# Patient Record
Sex: FEMALE | Race: WHITE | NOT HISPANIC OR LATINO | Employment: OTHER | URBAN - METROPOLITAN AREA
[De-identification: names, ages, dates, MRNs, and addresses within clinical notes are randomized per-mention and may not be internally consistent; named-entity substitution may affect disease eponyms.]

---

## 2018-02-12 ENCOUNTER — OFFICE VISIT (OUTPATIENT)
Dept: PODIATRY | Facility: CLINIC | Age: 83
End: 2018-02-12
Payer: MEDICARE

## 2018-02-12 VITALS
SYSTOLIC BLOOD PRESSURE: 138 MMHG | RESPIRATION RATE: 17 BRPM | WEIGHT: 200 LBS | HEART RATE: 81 BPM | HEIGHT: 65 IN | BODY MASS INDEX: 33.32 KG/M2 | DIASTOLIC BLOOD PRESSURE: 89 MMHG

## 2018-02-12 DIAGNOSIS — I70.209 PERIPHERAL ARTERIOSCLEROSIS (HCC): ICD-10-CM

## 2018-02-12 DIAGNOSIS — E11.42 DIABETIC POLYNEUROPATHY ASSOCIATED WITH TYPE 2 DIABETES MELLITUS (HCC): ICD-10-CM

## 2018-02-12 DIAGNOSIS — M21.969 ACQUIRED DEFORMITY OF FOOT, UNSPECIFIED LATERALITY: Primary | ICD-10-CM

## 2018-02-12 DIAGNOSIS — M20.42 HAMMER TOES OF BOTH FEET: ICD-10-CM

## 2018-02-12 DIAGNOSIS — M79.89 SWELLING OF LOWER LIMB: ICD-10-CM

## 2018-02-12 DIAGNOSIS — M20.41 HAMMER TOES OF BOTH FEET: ICD-10-CM

## 2018-02-12 PROCEDURE — 99203 OFFICE O/P NEW LOW 30 MIN: CPT | Performed by: PODIATRIST

## 2018-02-12 RX ORDER — SOTALOL HYDROCHLORIDE 80 MG/1
TABLET ORAL
COMMUNITY
Start: 2018-01-22

## 2018-02-12 RX ORDER — GABAPENTIN 100 MG/1
100 CAPSULE ORAL 3 TIMES DAILY
Qty: 90 CAPSULE | Refills: 0 | Status: SHIPPED | OUTPATIENT
Start: 2018-02-12 | End: 2018-04-09 | Stop reason: SDUPTHER

## 2018-02-12 RX ORDER — SPIRONOLACTONE 25 MG/1
TABLET ORAL
COMMUNITY
Start: 2017-12-06

## 2018-02-12 RX ORDER — ATORVASTATIN CALCIUM 80 MG/1
TABLET, FILM COATED ORAL
COMMUNITY
Start: 2017-12-28

## 2018-02-12 RX ORDER — LINAGLIPTIN 5 MG/1
TABLET, FILM COATED ORAL
COMMUNITY
Start: 2018-02-10

## 2018-02-12 NOTE — PROGRESS NOTES
Assessment/Plan:  Patient has edema  Patient has diabetic neuropathy  Ingrown  Toenails  Plan  Diabetic foot exam performed  Venous Doppler testing be ordered to rule out deep venous insufficiency  We will add a empiric course of gabapentin  No problem-specific Assessment & Plan notes found for this encounter  There are no diagnoses linked to this encounter  Subjective:      Patient ID: Niall Haddad is a 80 y o  female  Patient presents for evaluation  Patient is a diabetic  She complains of pain in her feet and legs at night  She is also concerned with ingrown toenails  She is concerned with swelling of her legs and feet that occurred at the end of the day  The following portions of the patient's history were reviewed and updated as appropriate: allergies, current medications, past family history, past medical history, past social history, past surgical history and problem list   Review of Systems      Objective: Foot Exam    Right Foot/Ankle     Inspection and Palpation  Ecchymosis: none  Tenderness: bony tenderness   Swelling: metatarsals   Arch: pes planus  Hammertoes: second toe and third toe  Skin Exam: skin intact; Neurovascular  Dorsalis pedis: 1+  Posterior tibial: 1+  Saphenous nerve sensation: diminished  Tibial nerve sensation: diminished  Superficial peroneal nerve sensation: diminished  Deep peroneal nerve sensation: diminished  Sural nerve sensation: diminished  Achilles reflex: 0    Tests  Too many toes: positive   Heel raise: pain       Left Foot/Ankle      Inspection and Palpation  Ecchymosis: none  Tenderness: bony tenderness   Swelling: metatarsals   Arch: pes planus  Hammertoes: second toe and third toe  Skin Exam: skin intact;      Neurovascular  Dorsalis pedis: 1+  Posterior tibial: 1+  Saphenous nerve sensation: diminished  Tibial nerve sensation: diminished  Superficial peroneal nerve sensation: diminished  Deep peroneal nerve sensation: diminished  Sural nerve sensation: diminished  Achilles reflex: 0    Tests  Too many toes: positive   Heel raise: pain       Physical Exam   Cardiovascular: Pulses are weak pulses  Pulses:       Dorsalis pedis pulses are 1+ on the right side, and 1+ on the left side  Posterior tibial pulses are 1+ on the right side, and 1+ on the left side  Musculoskeletal:        Right foot: There is bony tenderness  Left foot: There is bony tenderness  Feet:   Right Foot:   Skin Integrity: Positive for warmth  Left Foot:   Skin Integrity: Positive for warmth  Neurological:   Reflex Scores:       Achilles reflexes are 0 on the right side and 0 on the left side  Patient's shoes and socks removed  Right Foot/Ankle   Right Foot Inspection  Skin Exam: skin intact and warmth                          Toe Exam: swelling  Sensory   Vibration: diminished  Proprioception: diminished   Monofilament testing: diminished  Vascular  Capillary refills: < 3 seconds  The right DP pulse is 1+  The right PT pulse is 1+  Right Toe  - Comprehensive Exam  Ecchymosis: none  Arch: pes planus  Hammertoes: second toe and third toe  Swelling: metatarsals   Tenderness: bony tenderness         Left Foot/Ankle  Left Foot Inspection  Skin Exam: skin intact and warmth                         Toe Exam: swelling                   Sensory   Vibration: diminished  Proprioception: diminished  Monofilament: diminished  Vascular  Capillary refills: < 3 seconds  The left DP pulse is 1+  The left PT pulse is 1+  Left Toe  - Comprehensive Exam  Ecchymosis: none  Arch: pes planus  Hammertoes: second toe and third toe  Swelling: metatarsals   Tenderness: bony tenderness       Assign Risk Category:  Deformity present;  Loss of protective sensation; Weak pulses       Risk: 2

## 2018-03-12 ENCOUNTER — OFFICE VISIT (OUTPATIENT)
Dept: PODIATRY | Facility: CLINIC | Age: 83
End: 2018-03-12
Payer: MEDICARE

## 2018-03-12 VITALS
RESPIRATION RATE: 17 BRPM | HEIGHT: 65 IN | WEIGHT: 200 LBS | HEART RATE: 78 BPM | SYSTOLIC BLOOD PRESSURE: 142 MMHG | DIASTOLIC BLOOD PRESSURE: 80 MMHG | BODY MASS INDEX: 33.32 KG/M2

## 2018-03-12 DIAGNOSIS — I70.209 PERIPHERAL ARTERIOSCLEROSIS (HCC): ICD-10-CM

## 2018-03-12 DIAGNOSIS — M21.969 ACQUIRED DEFORMITY OF FOOT, UNSPECIFIED LATERALITY: ICD-10-CM

## 2018-03-12 DIAGNOSIS — E11.42 DIABETIC POLYNEUROPATHY ASSOCIATED WITH TYPE 2 DIABETES MELLITUS (HCC): Primary | ICD-10-CM

## 2018-03-12 PROCEDURE — 99213 OFFICE O/P EST LOW 20 MIN: CPT | Performed by: PODIATRIST

## 2018-03-12 RX ORDER — DULOXETIN HYDROCHLORIDE 30 MG/1
30 CAPSULE, DELAYED RELEASE ORAL DAILY
Qty: 30 CAPSULE | Refills: 0 | Status: SHIPPED | OUTPATIENT
Start: 2018-03-12 | End: 2018-04-11

## 2018-03-12 NOTE — PROGRESS NOTES
Assessment/Plan:  Patient has diabetic neuropathy  Plan  We will change to Cymbalta    No problem-specific Assessment & Plan notes found for this encounter  There are no diagnoses linked to this encounter  Subjective:      Patient ID: Ana Dugan is a 80 y o  female  Patient has continued pain in her feet  She was unable to tolerate gabapentin  The following portions of the patient's history were reviewed and updated as appropriate: allergies, current medications, past family history, past medical history, past social history, past surgical history and problem list     Review of Systems      Objective:  Patient ID: Ana Dugan is a 80 y o  female      Patient presents for evaluation  Patient is a diabetic  She complains of pain in her feet and legs at night  She is also concerned with ingrown toenails    She is concerned with swelling of her legs and feet that occurred at the end of the day            The following portions of the patient's history were reviewed and updated as appropriate: allergies, current medications, past family history, past medical history, past social history, past surgical history and problem list   Review of Systems       Objective:      Foot Exam     Right Foot/Ankle      Inspection and Palpation  Ecchymosis: none  Tenderness: bony tenderness   Swelling: metatarsals   Arch: pes planus  Hammertoes: second toe and third toe  Skin Exam: skin intact;      Neurovascular  Dorsalis pedis: 1+  Posterior tibial: 1+  Saphenous nerve sensation: diminished  Tibial nerve sensation: diminished  Superficial peroneal nerve sensation: diminished  Deep peroneal nerve sensation: diminished  Sural nerve sensation: diminished  Achilles reflex: 0     Tests  Too many toes: positive   Heel raise: pain         Left Foot/Ankle       Inspection and Palpation  Ecchymosis: none  Tenderness: bony tenderness   Swelling: metatarsals   Arch: pes planus  Hammertoes: second toe and third toe  Skin Exam: skin intact;      Neurovascular  Dorsalis pedis: 1+  Posterior tibial: 1+  Saphenous nerve sensation: diminished  Tibial nerve sensation: diminished  Superficial peroneal nerve sensation: diminished  Deep peroneal nerve sensation: diminished  Sural nerve sensation: diminished  Achilles reflex: 0     Tests  Too many toes: positive   Heel raise: pain      Physical Exam   Cardiovascular: Pulses are weak pulses  Pulses:       Dorsalis pedis pulses are 1+ on the right side, and 1+ on the left side  Posterior tibial pulses are 1+ on the right side, and 1+ on the left side  Musculoskeletal:        Right foot: There is bony tenderness  Left foot: There is bony tenderness  Feet:   Right Foot:   Skin Integrity: Positive for warmth  Left Foot:   Skin Integrity: Positive for warmth  Neurological:   Reflex Scores:       Achilles reflexes are 0 on the right side and 0 on the left side  Patient's shoes and socks removed  Right Foot/Ankle   Right Foot Inspection  Skin Exam: skin intact and warmth                           Toe Exam: swelling  Sensory   Vibration: diminished  Proprioception: diminished   Monofilament testing: diminished  Vascular  Capillary refills: < 3 seconds  The right DP pulse is 1+  The right PT pulse is 1+  Right Toe  - Comprehensive Exam  Ecchymosis: none  Arch: pes planus  Hammertoes: second toe and third toe  Swelling: metatarsals   Tenderness: bony tenderness            Left Foot/Ankle  Left Foot Inspection  Skin Exam: skin intact and warmth                          Toe Exam: swelling                   Sensory   Vibration: diminished  Proprioception: diminished  Monofilament: diminished  Vascular  Capillary refills: < 3 seconds  The left DP pulse is 1+  The left PT pulse is 1+     Left Toe  - Comprehensive Exam  Ecchymosis: none  Arch: pes planus  Hammertoes: second toe and third toe  Swelling: metatarsals   Tenderness: bony tenderness         Assign Risk Category:  Deformity present;  Loss of protective sensation; Weak pulses       Risk: 2         Foot ExamPhysical Exam

## 2018-04-09 ENCOUNTER — OFFICE VISIT (OUTPATIENT)
Dept: PODIATRY | Facility: CLINIC | Age: 83
End: 2018-04-09
Payer: MEDICARE

## 2018-04-09 VITALS
HEART RATE: 67 BPM | DIASTOLIC BLOOD PRESSURE: 84 MMHG | RESPIRATION RATE: 17 BRPM | SYSTOLIC BLOOD PRESSURE: 144 MMHG | BODY MASS INDEX: 33.32 KG/M2 | HEIGHT: 65 IN | WEIGHT: 200 LBS

## 2018-04-09 DIAGNOSIS — M77.41 METATARSALGIA OF BOTH FEET: ICD-10-CM

## 2018-04-09 DIAGNOSIS — M21.969 ACQUIRED DEFORMITY OF FOOT, UNSPECIFIED LATERALITY: Primary | ICD-10-CM

## 2018-04-09 DIAGNOSIS — M77.42 METATARSALGIA OF BOTH FEET: ICD-10-CM

## 2018-04-09 DIAGNOSIS — I70.209 PERIPHERAL ARTERIOSCLEROSIS (HCC): ICD-10-CM

## 2018-04-09 DIAGNOSIS — E11.42 DIABETIC POLYNEUROPATHY ASSOCIATED WITH TYPE 2 DIABETES MELLITUS (HCC): ICD-10-CM

## 2018-04-09 PROCEDURE — 99212 OFFICE O/P EST SF 10 MIN: CPT | Performed by: PODIATRIST

## 2018-04-09 RX ORDER — GABAPENTIN 100 MG/1
100 CAPSULE ORAL 3 TIMES DAILY
Qty: 90 CAPSULE | Refills: 0 | Status: SHIPPED | OUTPATIENT
Start: 2018-04-09 | End: 2018-05-09

## 2018-04-09 RX ORDER — DULAGLUTIDE 0.75 MG/.5ML
INJECTION, SOLUTION SUBCUTANEOUS
COMMUNITY
Start: 2018-04-05

## 2018-04-09 RX ORDER — FLUCONAZOLE 150 MG/1
TABLET ORAL
COMMUNITY
Start: 2018-02-19

## 2018-04-09 RX ORDER — NYSTATIN 100000 U/G
OINTMENT TOPICAL
COMMUNITY
Start: 2018-03-03

## 2018-04-09 NOTE — PROGRESS NOTES
Assessment/Plan:  Improving diabetic neuropathy  Plan  All mycotic nails debrided  Increasing gabapentin will be ordered  No problem-specific Assessment & Plan notes found for this encounter       Foot Exam     Right Foot/Ankle      Inspection and Palpation  Ecchymosis: none  Tenderness: bony tenderness   Swelling: metatarsals   Arch: pes planus  Hammertoes: second toe and third toe  Skin Exam: skin intact;      Neurovascular  Dorsalis pedis: 1+  Posterior tibial: 1+  Saphenous nerve sensation: diminished  Tibial nerve sensation: diminished  Superficial peroneal nerve sensation: diminished  Deep peroneal nerve sensation: diminished  Sural nerve sensation: diminished  Achilles reflex: 0     Tests  Too many toes: positive   Heel raise: pain         Left Foot/Ankle       Inspection and Palpation  Ecchymosis: none  Tenderness: bony tenderness   Swelling: metatarsals   Arch: pes planus  Hammertoes: second toe and third toe  Skin Exam: skin intact;      Neurovascular  Dorsalis pedis: 1+  Posterior tibial: 1+  Saphenous nerve sensation: diminished  Tibial nerve sensation: diminished  Superficial peroneal nerve sensation: diminished  Deep peroneal nerve sensation: diminished  Sural nerve sensation: diminished  Achilles reflex: 0     Tests  Too many toes: positive   Heel raise: pain      Physical Exam   Cardiovascular: Pulses are weak pulses  Pulses:       Dorsalis pedis pulses are 1+ on the right side, and 1+ on the left side         Posterior tibial pulses are 1+ on the right side, and 1+ on the left side  Musculoskeletal:        Right foot: There is bony tenderness         Left foot: There is bony tenderness  Feet:   Right Foot:   Skin Integrity: Positive for warmth  Left Foot:   Skin Integrity: Positive for warmth  Neurological:   Reflex Scores:       Achilles reflexes are 0 on the right side and 0 on the left side  Patient's shoes and socks removed  Right Foot/Ankle   Right Foot Inspection  Skin Exam: skin intact and warmth               Toe Exam: swelling  Sensory   Vibration: diminished  Proprioception: diminished   Monofilament testing: diminished  Vascular  Capillary refills: < 3 seconds  The right DP pulse is 1+  The right PT pulse is 1+  Right Toe  - Comprehensive Exam  Ecchymosis: none  Arch: pes planus  Hammertoes: second toe and third toe  Swelling: metatarsals   Tenderness: bony tenderness            Left Foot/Ankle  Left Foot Inspection  Skin Exam: skin intact and warmth              Toe Exam: swelling                   Sensory   Vibration: diminished  Proprioception: diminished  Monofilament: diminished  Vascular  Capillary refills: < 3 seconds  The left DP pulse is 1+  The left PT pulse is 1+  Left Toe  - Comprehensive Exam  Ecchymosis: none  Arch: pes planus  Hammertoes: second toe and third toe  Swelling: metatarsals   Tenderness: bony tenderness         Assign Risk Category:  Deformity present; Loss of protective sensation; Weak pulses       Risk: 2        Diagnoses and all orders for this visit:    Acquired deformity of foot, unspecified laterality    Diabetic polyneuropathy associated with type 2 diabetes mellitus (HCC)  -     gabapentin (NEURONTIN) 100 mg capsule; Take 1 capsule (100 mg total) by mouth 3 (three) times a day for 30 days    Peripheral arteriosclerosis (HCC)    Metatarsalgia of both feet    Other orders  -     [facility];   -     nystatin (MYCOSTATIN) ointment;   -     fluconazole (DIFLUCAN) 150 mg tablet;           Subjective:      Patient ID: Juana Arias is a 80 y o  female  Patient is diabetic  She has pain in her feet with ambulation  She has much less pain in her feet at night    She is taking gabapentin 100 mg daily HS        The following portions of the patient's history were reviewed and updated as appropriate: allergies, current medications, past family history, past medical history, past social history, past surgical history and problem list     Review of Systems      Objective: Foot Exam    Right Foot/Ankle     Inspection and Palpation  Skin Exam: callus; Neurovascular  Dorsalis pedis: 0  Posterior tibial: 1+      Left Foot/Ankle      Inspection and Palpation  Skin Exam: callus; Neurovascular  Dorsalis pedis: 0  Posterior tibial: 1+        Physical Exam   Cardiovascular: Pulses are weak pulses  Pulses:       Dorsalis pedis pulses are 0 on the right side, and 0 on the left side  Posterior tibial pulses are 1+ on the right side, and 1+ on the left side  Feet:   Right Foot:   Skin Integrity: Positive for callus  Left Foot:   Skin Integrity: Positive for callus  Patient's shoes and socks removed  Right Foot/Ankle   Right Foot Inspection  Skin Exam: callus and callus                          Toe Exam: swelling and erythema  Sensory   Vibration: absent  Proprioception: absent   Monofilament testing: absent  Vascular  Capillary refills: < 3 seconds  The right DP pulse is 0  The right PT pulse is 1+  Left Foot/Ankle  Left Foot Inspection  Skin Exam: callus                         Toe Exam: swelling and erythema                   Sensory   Vibration: absent  Proprioception: absent  Monofilament: absent  Vascular  Capillary refills: elevated  The left DP pulse is 0  The left PT pulse is 1+  Assign Risk Category:  Deformity present;  Loss of protective sensation; Weak pulses       Risk: 2

## 2018-05-14 ENCOUNTER — OFFICE VISIT (OUTPATIENT)
Dept: PODIATRY | Facility: CLINIC | Age: 83
End: 2018-05-14
Payer: MEDICARE

## 2018-05-14 VITALS
RESPIRATION RATE: 17 BRPM | HEIGHT: 65 IN | HEART RATE: 68 BPM | BODY MASS INDEX: 33.32 KG/M2 | SYSTOLIC BLOOD PRESSURE: 133 MMHG | DIASTOLIC BLOOD PRESSURE: 74 MMHG | WEIGHT: 200 LBS

## 2018-05-14 DIAGNOSIS — M21.969 ACQUIRED DEFORMITY OF FOOT, UNSPECIFIED LATERALITY: Primary | ICD-10-CM

## 2018-05-14 DIAGNOSIS — M79.89 SWELLING OF LOWER LIMB: ICD-10-CM

## 2018-05-14 DIAGNOSIS — I70.209 PERIPHERAL ARTERIOSCLEROSIS (HCC): ICD-10-CM

## 2018-05-14 DIAGNOSIS — M77.42 METATARSALGIA OF BOTH FEET: ICD-10-CM

## 2018-05-14 DIAGNOSIS — E11.42 DIABETIC POLYNEUROPATHY ASSOCIATED WITH TYPE 2 DIABETES MELLITUS (HCC): ICD-10-CM

## 2018-05-14 DIAGNOSIS — M77.41 METATARSALGIA OF BOTH FEET: ICD-10-CM

## 2018-05-14 PROCEDURE — 99212 OFFICE O/P EST SF 10 MIN: CPT | Performed by: PODIATRIST

## 2018-05-14 RX ORDER — PEN NEEDLE, DIABETIC 32GX 5/32"
NEEDLE, DISPOSABLE MISCELLANEOUS
COMMUNITY
Start: 2018-04-10

## 2018-05-14 NOTE — PROGRESS NOTES
Procedures   Foot Exam     Assessment/Plan:  Improving diabetic neuropathy      Plan  All mycotic nails debrided  Increasing gabapentin will be ordered  No problem-specific Assessment & Plan notes found for this encounter       Foot Exam     Right Foot/Ankle      Inspection and Palpation  Ecchymosis: none  Tenderness: bony tenderness   Swelling: metatarsals   Arch: pes planus  Hammertoes: second toe and third toe  Skin Exam: skin intact;      Neurovascular  Dorsalis pedis: 1+  Posterior tibial: 1+  Saphenous nerve sensation: diminished  Tibial nerve sensation: diminished  Superficial peroneal nerve sensation: diminished  Deep peroneal nerve sensation: diminished  Sural nerve sensation: diminished  Achilles reflex: 0     Tests  Too many toes: positive   Heel raise: pain         Left Foot/Ankle       Inspection and Palpation  Ecchymosis: none  Tenderness: bony tenderness   Swelling: metatarsals   Arch: pes planus  Hammertoes: second toe and third toe  Skin Exam: skin intact;      Neurovascular  Dorsalis pedis: 1+  Posterior tibial: 1+  Saphenous nerve sensation: diminished  Tibial nerve sensation: diminished  Superficial peroneal nerve sensation: diminished  Deep peroneal nerve sensation: diminished  Sural nerve sensation: diminished  Achilles reflex: 0     Tests  Too many toes: positive   Heel raise: pain      Physical Exam   Cardiovascular: Pulses are weak pulses  Pulses:       Dorsalis pedis pulses are 1+ on the right side, and 1+ on the left side         Posterior tibial pulses are 1+ on the right side, and 1+ on the left side  Musculoskeletal:        Right foot: There is bony tenderness         Left foot: There is bony tenderness  Feet:   Right Foot:   Skin Integrity: Positive for warmth  Left Foot:   Skin Integrity: Positive for warmth  Neurological:   Reflex Scores:       Achilles reflexes are 0 on the right side and 0 on the left side  Patient's shoes and socks removed  Right Foot/Ankle   Right Foot Inspection  Skin Exam: skin intact and warmth               Toe Exam: swelling  Sensory   Vibration: diminished  Proprioception: diminished   Monofilament testing: diminished  Vascular  Capillary refills: < 3 seconds  The right DP pulse is 1+  The right PT pulse is 1+  Right Toe  - Comprehensive Exam  Ecchymosis: none  Arch: pes planus  Hammertoes: second toe and third toe  Swelling: metatarsals   Tenderness: bony tenderness            Left Foot/Ankle  Left Foot Inspection  Skin Exam: skin intact and warmth              Toe Exam: swelling                   Sensory   Vibration: diminished  Proprioception: diminished  Monofilament: diminished  Vascular  Capillary refills: < 3 seconds  The left DP pulse is 1+  The left PT pulse is 1+  Left Toe  - Comprehensive Exam  Ecchymosis: none  Arch: pes planus  Hammertoes: second toe and third toe  Swelling: metatarsals   Tenderness: bony tenderness         Assign Risk Category:  Deformity present; Loss of protective sensation; Weak pulses       Risk: 2         Diagnoses and all orders for this visit:     Acquired deformity of foot, unspecified laterality     Diabetic polyneuropathy associated with type 2 diabetes mellitus (HCC)  -     gabapentin (NEURONTIN) 100 mg capsule; Take 1 capsule (100 mg total) by mouth 3 (three) times a day for 30 days     Peripheral arteriosclerosis (HCC)     Metatarsalgia of both feet     Other orders  -     TRULICITY 6 21 UE/6 2JO SOPN;   -     nystatin (MYCOSTATIN) ointment;   -     fluconazole (DIFLUCAN) 150 mg tablet;             Subjective:       Patient ID: Sandra Garza is a 80 y o  female      Patient is diabetic  She has pain in her feet with ambulation  She has much less pain in her feet at night    She is taking gabapentin 100 mg daily HS        The following portions of the patient's history were reviewed and updated as appropriate: allergies, current medications, past family history, past medical history, past social history, past surgical history and problem list      Review of Systems       Objective:      Foot Exam     Right Foot/Ankle      Inspection and Palpation  Skin Exam: callus;      Neurovascular  Dorsalis pedis: 0  Posterior tibial: 1+        Left Foot/Ankle       Inspection and Palpation  Skin Exam: callus;      Neurovascular  Dorsalis pedis: 0  Posterior tibial: 1+        Physical Exam   Cardiovascular: Pulses are weak pulses  Pulses:       Dorsalis pedis pulses are 0 on the right side, and 0 on the left side  Posterior tibial pulses are 1+ on the right side, and 1+ on the left side  Feet:   Right Foot:   Skin Integrity: Positive for callus  Left Foot:   Skin Integrity: Positive for callus  Patient's shoes and socks removed  Right Foot/Ankle   Right Foot Inspection  Skin Exam: callus and callus                           Toe Exam: swelling and erythema  Sensory   Vibration: absent  Proprioception: absent   Monofilament testing: absent  Vascular  Capillary refills: < 3 seconds  The right DP pulse is 0  The right PT pulse is 1+       Left Foot/Ankle  Left Foot Inspection  Skin Exam: callus                          Toe Exam: swelling and erythema                   Sensory   Vibration: absent  Proprioception: absent  Monofilament: absent  Vascular  Capillary refills: elevated  The left DP pulse is 0  The left PT pulse is 1+  Assign Risk Category:  Deformity present;  Loss of protective sensation; Weak pulses       Risk: 2

## 2018-08-14 ENCOUNTER — OFFICE VISIT (OUTPATIENT)
Dept: PODIATRY | Facility: CLINIC | Age: 83
End: 2018-08-14
Payer: MEDICARE

## 2018-08-14 VITALS
DIASTOLIC BLOOD PRESSURE: 75 MMHG | RESPIRATION RATE: 17 BRPM | HEIGHT: 65 IN | WEIGHT: 200 LBS | BODY MASS INDEX: 33.32 KG/M2 | SYSTOLIC BLOOD PRESSURE: 138 MMHG | HEART RATE: 68 BPM

## 2018-08-14 DIAGNOSIS — R20.2 PARESTHESIA: ICD-10-CM

## 2018-08-14 DIAGNOSIS — L60.0 INGROWN TOENAIL: ICD-10-CM

## 2018-08-14 DIAGNOSIS — L02.611 ABSCESS OR CELLULITIS, TOE, RIGHT: Primary | ICD-10-CM

## 2018-08-14 DIAGNOSIS — L03.031 ABSCESS OR CELLULITIS, TOE, RIGHT: Primary | ICD-10-CM

## 2018-08-14 DIAGNOSIS — E11.42 DIABETIC POLYNEUROPATHY ASSOCIATED WITH TYPE 2 DIABETES MELLITUS (HCC): ICD-10-CM

## 2018-08-14 PROCEDURE — 99213 OFFICE O/P EST LOW 20 MIN: CPT | Performed by: PODIATRIST

## 2018-08-14 RX ORDER — CEFADROXIL 500 MG/1
500 CAPSULE ORAL EVERY 12 HOURS SCHEDULED
Qty: 14 CAPSULE | Refills: 1 | Status: SHIPPED | OUTPATIENT
Start: 2018-08-14 | End: 2018-08-21

## 2018-08-14 NOTE — PROGRESS NOTES
Assessment/Plan:    Partial avulsion ingrown nails  Right 3rd and 4th digits silver nitrate and dressing applied  Warm water and Epsom salt soaks  Neosporin dressing daily  Patient to discontinue medication if she has any signs of allergy although she says she has taken Keflex before and think she has taken other cephalosporins  Discussed possible need for increased dose of gabapentin because she is still having significant nerve pain especially at night although we will not do it in till she gets her blood sugar under better control since she recently started taking insulin  Follow-up 2 weeks     Diagnoses and all orders for this visit:    Abscess or cellulitis, toe, right  -     cefadroxil (DURICEF) 500 mg capsule; Take 1 capsule (500 mg total) by mouth every 12 (twelve) hours for 7 days    Ingrown toenail    Paresthesia    Diabetic polyneuropathy associated with type 2 diabetes mellitus ([facility] )          Subjective:      Patient ID: Maria R Pritchett is a 80 y o  female  Patient presents for emergency visit for infected ingrown nail right 3rd digit  Patient has had redness and swelling for the past few days  Patient denies any fever chills  Patient also has mild ingrown of the right 4th digit  Patient's diabetes has been completely out of control blood sugar has been fluctuating for the past few months A1c was 13  Patient just started insulin  Patient has been on gabapentin wonder mg p o  3 times a day says it has been helping but she is often tired although this may be secondary to poorly-controlled blood sugar  No past medical history on file  No past surgical history on file      Allergies   Allergen Reactions    Latex     Other      Adhesive tapo    Penicillins          Current Outpatient Prescriptions:     atorvastatin (LIPITOR) 80 mg tablet, , Disp: , Rfl:     BD PEN NEEDLE ALAINA U/F 32G X 4 MM MISC, , Disp: , Rfl:     cefadroxil (DURICEF) 500 mg capsule, Take 1 capsule (500 mg total) by mouth every 12 (twelve) hours for 7 days, Disp: 14 capsule, Rfl: 1    DULoxetine (CYMBALTA) 30 mg delayed release capsule, Take 1 capsule (30 mg total) by mouth daily for 30 days, Disp: 30 capsule, Rfl: 0    fluconazole (DIFLUCAN) 150 mg tablet, , Disp: , Rfl:     gabapentin (NEURONTIN) 100 mg capsule, Take 1 capsule (100 mg total) by mouth 3 (three) times a day for 30 days, Disp: 90 capsule, Rfl: 0    nystatin (MYCOSTATIN) ointment, , Disp: , Rfl:     sotalol (BETAPACE) 80 mg tablet, , Disp: , Rfl:     spironolactone (ALDACTONE) 25 mg tablet, , Disp: , Rfl:     TRADJENTA 5 MG TABS, , Disp: , Rfl:     TRULICITY 7 96 JQ/6 8FK SOPN, , Disp: , Rfl:     Patient Active Problem List   Diagnosis    Acquired deformity of foot    Diabetic polyneuropathy associated with type 2 diabetes mellitus (HCC)    Peripheral arteriosclerosis (HCC)    Hammer toes of both feet    Swelling of lower limb    Metatarsalgia of both feet       Review of Systems   Constitutional: Negative  HENT: Negative  Eyes: Negative  Respiratory: Negative  Cardiovascular: Negative  Gastrointestinal: Negative  Endocrine: Negative  Genitourinary: Negative  Musculoskeletal: Negative  Skin: Negative  Allergic/Immunologic: Negative  Neurological: Negative  Hematological: Negative  Psychiatric/Behavioral: Negative  Objective:  Patient's shoes and socks were removed, feet examined  /75   Pulse 68   Resp 17   Ht 5' 5" (1 651 m)   Wt 90 7 kg (200 lb)   BMI 33 28 kg/m²          Physical Exam   Cardiovascular: Pulses are weak pulses  Pulses:       Dorsalis pedis pulses are 1+ on the right side, and 1+ on the left side  Posterior tibial pulses are 1+ on the right side, and 1+ on the left side  Feet:   Right Foot:   Skin Integrity: Positive for callus  Left Foot:   Skin Integrity: Positive for callus  Patient's shoes and socks removed  Right Foot/Ankle   Right Foot Inspection  Skin Exam: callus and callus                            Sensory   Vibration: absent  Proprioception: diminished   Monofilament testing: diminished  Vascular  Capillary refills: elevated  The right DP pulse is 1+  The right PT pulse is 1+  Left Foot/Ankle  Left Foot Inspection  Skin Exam: callus                                         Sensory   Vibration: absent  Proprioception: diminished  Monofilament: diminished  Vascular  Capillary refills: elevated  The left DP pulse is 1+  The left PT pulse is 1+  Assign Risk Category:  Deformity present; Loss of protective sensation; Weak pulses       Risk: 2       Inspection and Palpation  Ecchymosis: none  Tenderness: bony tenderness   Swelling: metatarsals   Arch: pes planus  Hammertoes: second toe and third toe  Skin Exam: skin intact;      Neurovascular  Dorsalis pedis: 1+  Posterior tibial: 1+  Saphenous nerve sensation: diminished  Tibial nerve sensation: diminished  Superficial peroneal nerve sensation: diminished  Deep peroneal nerve sensation: diminished  Sural nerve sensation: diminished  Achilles reflex: 0     Tests  Too many toes: positive   Heel raise: pain         Left Foot/Ankle       Inspection and Palpation  Ecchymosis: none  Tenderness: bony tenderness   Swelling: metatarsals   Arch: pes planus  Hammertoes: second toe and third toe  Skin Exam: skin intact;      Neurovascular  Dorsalis pedis: 1+  Posterior tibial: 1+  Saphenous nerve sensation: diminished  Tibial nerve sensation: diminished  Superficial peroneal nerve sensation: diminished  Deep peroneal nerve sensation: diminished  Sural nerve sensation: diminished  Achilles reflex: 0     Tests  Too many toes: positive   Heel raise: pain      Physical Exam   Cardiovascular: Pulses are weak pulses  Pulses:       Dorsalis pedis pulses are 1+ on the right side, and 1+ on the left side         Posterior tibial pulses are 1+ on the right side, and 1+ on the left side     Musculoskeletal:      Right foot: There is bony tenderness         Left foot: There is bony tenderness  Feet:   Right Foot:   Skin Integrity: Positive for warmth  Left Foot:   Skin Integrity: Positive for warmth  Neurological:   Reflex Scores:       Achilles reflexes are 0 on the right side and 0 on the left side  Patient's shoes and socks removed  Right Foot/Ankle   Right Foot Inspection  Skin Exam: skin intact and warmth               Toe Exam: swelling  Sensory   Vibration: diminished  Proprioception: diminished   Monofilament testing: diminished  Vascular  Capillary refills: < 3 seconds  The right DP pulse is 1+  The right PT pulse is 1+  Right Toe  - Comprehensive Exam  Ecchymosis: none  Arch: pes planus  Hammertoes: second toe and third toe  Swelling: metatarsals   Tenderness: bony tenderness            Left Foot/Ankle  Left Foot Inspection  Skin Exam: skin intact and warmth              Toe Exam: swelling                   Sensory   Vibration: diminished  Proprioception: diminished  Monofilament: diminished  Vascular  Capillary refills: < 3 seconds  The left DP pulse is 1+  The left PT pulse is 1+  Left Toe  - Comprehensive Exam  Ecchymosis: none  Arch: pes planus  Hammertoes: second toe and third toe  Swelling: metatarsals   Tenderness: bony tenderness         Assign Risk Category:  Deformity present; Loss of protective sensation; Weak pulses       Risk: 2         Diagnoses and all orders for this visit:     Acquired deformity of foot, unspecified laterality     Diabetic polyneuropathy associated with type 2 diabetes mellitus (HCC)  -     gabapentin (NEURONTIN) 100 mg capsule; Take 1 capsule (100 mg total) by mouth 3 (three) times a day for 30 days     Peripheral arteriosclerosis (HCC)     Metatarsalgia of both feet     Other orders  -     TRULICITY 0 91 FT/4 2GB SOPN;   -     nystatin (MYCOSTATIN) ointment;    -     fluconazole (DIFLUCAN) 150 mg tablet;            Subjective:       Patient ID: Phyllis avalos a 83 y o  female      Patient is diabetic   She has pain in her feet with ambulation   She has much less pain in her feet at night   She is taking gabapentin 100 mg daily HS        The following portions of the patient's history were reviewed and updated as appropriate: allergies, current medications, past family history, past medical history, past social history, past surgical history and problem list      Review of Systems       Objective:      Foot Exam     Right Foot/Ankle      Inspection and Palpation  Skin Exam: callus;      Neurovascular  Dorsalis pedis: 0  Posterior tibial: 1+        Left Foot/Ankle       Inspection and Palpation  Skin Exam: callus;      Neurovascular  Dorsalis pedis: 0  Posterior tibial: 1+        Physical Exam   Cardiovascular: Pulses are weak pulses  Pulses:       Dorsalis pedis pulses are 0 on the right side, and 0 on the left side         Posterior tibial pulses are 1+ on the right side, and 1+ on the left side  Feet:   Right Foot:   Skin Integrity: Positive for callus  Left Foot:   Skin Integrity: Positive for callus  Patient's shoes and socks removed  Right Foot/Ankle   Right Foot Inspection  Skin Exam: callus and callus               Toe Exam: swelling and erythema  Sensory   Vibration: absent  Proprioception: absent   Monofilament testing: absent  Vascular  Capillary refills: < 3 seconds  The right DP pulse is 0  The right PT pulse is 1+       Left Foot/Ankle  Left Foot Inspection  Skin Exam: callus              Toe Exam: swelling and erythema                   Sensory   Vibration: absent  Proprioception: absent  Monofilament: absent  Vascular  Capillary refills: elevated  The left DP pulse is 0  The left PT pulse is 1+  Assign Risk Category:  Deformity present; Loss of protective sensation; Weak pulses        Infected ingrown nail right 3rd digit medial border    Positive erythema to MPJ negative fluctuance  Mild ingrown right 4th digit medial border          Risk: 2

## 2018-08-24 ENCOUNTER — OFFICE VISIT (OUTPATIENT)
Dept: PODIATRY | Facility: CLINIC | Age: 83
End: 2018-08-24
Payer: MEDICARE

## 2018-08-24 VITALS
WEIGHT: 200 LBS | HEIGHT: 65 IN | RESPIRATION RATE: 17 BRPM | BODY MASS INDEX: 33.32 KG/M2 | DIASTOLIC BLOOD PRESSURE: 84 MMHG | SYSTOLIC BLOOD PRESSURE: 143 MMHG

## 2018-08-24 DIAGNOSIS — L02.611 CELLULITIS AND ABSCESS OF TOE OF RIGHT FOOT: Primary | ICD-10-CM

## 2018-08-24 DIAGNOSIS — B35.3 TINEA PEDIS OF RIGHT FOOT: ICD-10-CM

## 2018-08-24 DIAGNOSIS — L03.031: ICD-10-CM

## 2018-08-24 DIAGNOSIS — L03.031 CELLULITIS AND ABSCESS OF TOE OF RIGHT FOOT: Primary | ICD-10-CM

## 2018-08-24 PROCEDURE — 10060 I&D ABSCESS SIMPLE/SINGLE: CPT | Performed by: PODIATRIST

## 2018-08-24 PROCEDURE — 99212 OFFICE O/P EST SF 10 MIN: CPT | Performed by: PODIATRIST

## 2018-08-24 RX ORDER — TERBINAFINE HYDROCHLORIDE 250 MG/1
TABLET ORAL
Qty: 15 TABLET | Refills: 0 | Status: SHIPPED | OUTPATIENT
Start: 2018-08-24 | End: 2018-09-24

## 2018-08-24 RX ORDER — CIPROFLOXACIN 500 MG/1
500 TABLET, FILM COATED ORAL EVERY 12 HOURS SCHEDULED
Qty: 14 TABLET | Refills: 0 | Status: SHIPPED | OUTPATIENT
Start: 2018-08-24 | End: 2018-08-31

## 2018-08-24 NOTE — PROGRESS NOTES
Assessment/Plan:  Abscess 4th right toe  Complex tinea pedis right foot    Plan  Incision and drainage of abscess done  Gentian violet applied to interdigital spaces  We will add terbinafine, Cipro and topical anti fungal      There are no diagnoses linked to this encounter  Subjective:  Patient presents as urgent visit  She has increasing pain of the right foot  Pain is status post debridement of toenail  Patient ID: Rere Story is a 80 y o  female  No past medical history on file  No past surgical history on file      Allergies   Allergen Reactions    Latex     Other      Adhesive tapo    Penicillins          Current Outpatient Prescriptions:     atorvastatin (LIPITOR) 80 mg tablet, , Disp: , Rfl:     BD PEN NEEDLE ALAINA U/F 32G X 4 MM MISC, , Disp: , Rfl:     DULoxetine (CYMBALTA) 30 mg delayed release capsule, Take 1 capsule (30 mg total) by mouth daily for 30 days, Disp: 30 capsule, Rfl: 0    fluconazole (DIFLUCAN) 150 mg tablet, , Disp: , Rfl:     gabapentin (NEURONTIN) 100 mg capsule, Take 1 capsule (100 mg total) by mouth 3 (three) times a day for 30 days, Disp: 90 capsule, Rfl: 0    nystatin (MYCOSTATIN) ointment, , Disp: , Rfl:     sotalol (BETAPACE) 80 mg tablet, , Disp: , Rfl:     spironolactone (ALDACTONE) 25 mg tablet, , Disp: , Rfl:     TRADJENTA 5 MG TABS, , Disp: , Rfl:     TRULICITY 5 59 ZD/3 3BE SOPN, , Disp: , Rfl:     Patient Active Problem List   Diagnosis    Acquired deformity of foot    Diabetic polyneuropathy associated with type 2 diabetes mellitus (HCC)    Peripheral arteriosclerosis ([address] )    Hammer toes of both feet    Swelling of lower limb    Metatarsalgia of both feet       HPI    The following portions of the patient's history were reviewed and updated as appropriate: allergies, current medications, past family history, past medical history, past social history, past surgical history and problem list     Review of Systems      Historical Information No past medical history on file  No past surgical history on file  Social History   History   Alcohol use Not on file     History   Drug use: Unknown     Family History: No family history on file  Meds/Allergies   Allergies   Allergen Reactions    Latex     Other      Adhesive tapo    Penicillins        Current Outpatient Prescriptions on File Prior to Visit   Medication Sig Dispense Refill    atorvastatin (LIPITOR) 80 mg tablet       BD PEN NEEDLE ALAINA U/F 32G X 4 MM MISC       DULoxetine (CYMBALTA) 30 mg delayed release capsule Take 1 capsule (30 mg total) by mouth daily for 30 days 30 capsule 0    fluconazole (DIFLUCAN) 150 mg tablet       gabapentin (NEURONTIN) 100 mg capsule Take 1 capsule (100 mg total) by mouth 3 (three) times a day for 30 days 90 capsule 0    nystatin (MYCOSTATIN) ointment       sotalol (BETAPACE) 80 mg tablet       spironolactone (ALDACTONE) 25 mg tablet       TRADJENTA 5 MG TABS       TRULICITY 6 14 YX/9 8GV SOPN        No current facility-administered medications on file prior to visit  Objective:  Patient's shoes and socks removed  Foot ExamPhysical Exam   Constitutional: She appears well-developed and well-nourished  Cardiovascular: Normal rate and regular rhythm  Skin:   4th right toe demonstrates abscess of tibial nail groove  Incision and drainage done  Nail debrided  Serosanguineous exudate noted  Negative sinus or ascending cellulitis  Severe interdigital maceration noted 4th and 3rd interdigital space of right foot  This is complex tinea pedis with early cellulitis of right foot

## 2018-08-28 ENCOUNTER — OFFICE VISIT (OUTPATIENT)
Dept: PODIATRY | Facility: CLINIC | Age: 83
End: 2018-08-28

## 2018-08-28 VITALS
HEART RATE: 69 BPM | HEIGHT: 65 IN | RESPIRATION RATE: 17 BRPM | SYSTOLIC BLOOD PRESSURE: 150 MMHG | BODY MASS INDEX: 33.32 KG/M2 | DIASTOLIC BLOOD PRESSURE: 76 MMHG | WEIGHT: 200 LBS

## 2018-08-28 DIAGNOSIS — M77.42 METATARSALGIA OF BOTH FEET: ICD-10-CM

## 2018-08-28 DIAGNOSIS — E11.42 DIABETIC POLYNEUROPATHY ASSOCIATED WITH TYPE 2 DIABETES MELLITUS (HCC): ICD-10-CM

## 2018-08-28 DIAGNOSIS — B35.3 TINEA PEDIS OF RIGHT FOOT: Primary | ICD-10-CM

## 2018-08-28 DIAGNOSIS — M77.41 METATARSALGIA OF BOTH FEET: ICD-10-CM

## 2018-08-28 DIAGNOSIS — M20.42 HAMMER TOES OF BOTH FEET: ICD-10-CM

## 2018-08-28 DIAGNOSIS — I70.209 PERIPHERAL ARTERIOSCLEROSIS (HCC): ICD-10-CM

## 2018-08-28 DIAGNOSIS — L03.031 PARONYCHIA OF TOENAIL OF RIGHT FOOT: ICD-10-CM

## 2018-08-28 DIAGNOSIS — M20.41 HAMMER TOES OF BOTH FEET: ICD-10-CM

## 2018-08-28 PROCEDURE — 99024 POSTOP FOLLOW-UP VISIT: CPT | Performed by: PODIATRIST

## 2018-08-28 NOTE — PROGRESS NOTES
Assessment/Plan:  Paronychia 3rd right toe  Interdigital tinea pedis  Plan  3rd right toenail debrided  Gentian violet applied interdigital spaces  Patient will finish medication as directed  She will soak her foot daily    No problem-specific Assessment & Plan notes found for this encounter  There are no diagnoses linked to this encounter  Subjective:   Patient has pain in toes of right foot  She is taking medication as prescribed    No past medical history on file  No past surgical history on file      Allergies   Allergen Reactions    Latex     Other      Adhesive tapo    Penicillins          Current Outpatient Prescriptions:     atorvastatin (LIPITOR) 80 mg tablet, , Disp: , Rfl:     BD PEN NEEDLE ALAINA U/F 32G X 4 MM MISC, , Disp: , Rfl:     ciclopirox (LOPROX) 0 77 % cream, Apply topically 2 (two) times a day for 20 days, Disp: 45 g, Rfl: 0    ciprofloxacin (CIPRO) 500 mg tablet, Take 1 tablet (500 mg total) by mouth every 12 (twelve) hours for 7 days, Disp: 14 tablet, Rfl: 0    DULoxetine (CYMBALTA) 30 mg delayed release capsule, Take 1 capsule (30 mg total) by mouth daily for 30 days, Disp: 30 capsule, Rfl: 0    fluconazole (DIFLUCAN) 150 mg tablet, , Disp: , Rfl:     gabapentin (NEURONTIN) 100 mg capsule, Take 1 capsule (100 mg total) by mouth 3 (three) times a day for 30 days, Disp: 90 capsule, Rfl: 0    nystatin (MYCOSTATIN) ointment, , Disp: , Rfl:     sotalol (BETAPACE) 80 mg tablet, , Disp: , Rfl:     spironolactone (ALDACTONE) 25 mg tablet, , Disp: , Rfl:     terbinafine (LamISIL) 250 mg tablet, 1 tab p o  every other day , Disp: 15 tablet, Rfl: 0    TRADJENTA 5 MG TABS, , Disp: , Rfl:     TRULICITY 8 65 GY/6 5TZ SOPN, , Disp: , Rfl:     Patient Active Problem List   Diagnosis    Acquired deformity of foot    Diabetic polyneuropathy associated with type 2 diabetes mellitus (HCC)    Peripheral arteriosclerosis (HCC)    Hammer toes of both feet    Swelling of lower limb    Metatarsalgia of both feet    Cellulitis and abscess of toe of right foot    Abscess of fourth toenail, right    Tinea pedis of right foot          Patient ID: Nathanael Hoff is a 80 y o  female  HPI    The following portions of the patient's history were reviewed and updated as appropriate: allergies, current medications, past family history, past medical history, past social history, past surgical history and problem list     Review of Systems      Objective:  Patient's shoes and socks removed  Foot Exam    General  General Appearance: appears stated age and healthy   Orientation: alert and oriented to person, place, and time   Affect: appropriate   Gait: antalgic       Right Foot/Ankle     Inspection and Palpation  Tenderness: metatarsals   Swelling: metatarsals   Arch: pes planus  Hammertoes: third toe, fourth toe, fifth toe and second toe      Left Foot/Ankle      Inspection and Palpation  Arch: pes planus  Hammertoes: second toe and fifth toe        Physical Exam   Constitutional: She appears well-developed and well-nourished  Cardiovascular: Normal rate and regular rhythm  Skin:   Paronychia 3rd right toe    Interdigital tinea pedis

## 2018-10-09 ENCOUNTER — OFFICE VISIT (OUTPATIENT)
Dept: PODIATRY | Facility: CLINIC | Age: 83
End: 2018-10-09
Payer: MEDICARE

## 2018-10-09 VITALS
SYSTOLIC BLOOD PRESSURE: 130 MMHG | WEIGHT: 200 LBS | HEART RATE: 76 BPM | BODY MASS INDEX: 33.32 KG/M2 | DIASTOLIC BLOOD PRESSURE: 81 MMHG | HEIGHT: 65 IN | RESPIRATION RATE: 17 BRPM

## 2018-10-09 DIAGNOSIS — E11.42 DIABETIC POLYNEUROPATHY ASSOCIATED WITH TYPE 2 DIABETES MELLITUS (HCC): ICD-10-CM

## 2018-10-09 DIAGNOSIS — M79.672 PAIN IN BOTH FEET: ICD-10-CM

## 2018-10-09 DIAGNOSIS — M79.671 PAIN IN BOTH FEET: ICD-10-CM

## 2018-10-09 DIAGNOSIS — I70.209 PERIPHERAL ARTERIOSCLEROSIS (HCC): Primary | ICD-10-CM

## 2018-10-09 DIAGNOSIS — L84 CORNS: ICD-10-CM

## 2018-10-09 PROCEDURE — 11056 PARNG/CUTG B9 HYPRKR LES 2-4: CPT | Performed by: PODIATRIST

## 2018-10-09 NOTE — PROGRESS NOTES
Assessment/Plan:   Pain  Paronychia  Peripheral artery disease  Callus formation  Mycotic toenail  Plan  Diabetic foot exam performed  All nails debrided  Calluses debrided without pain or complication  There are no diagnoses linked to this encounter  Subjective:   Patient ID: Jerry Lomeli is a 80 y o  female  No past medical history on file  No past surgical history on file      Allergies   Allergen Reactions    Latex     Other      Adhesive tapo    Penicillins          Current Outpatient Prescriptions:     atorvastatin (LIPITOR) 80 mg tablet, , Disp: , Rfl:     BD PEN NEEDLE ALAINA U/F 32G X 4 MM MISC, , Disp: , Rfl:     ciclopirox (LOPROX) 0 77 % cream, Apply topically 2 (two) times a day for 20 days, Disp: 45 g, Rfl: 0    DULoxetine (CYMBALTA) 30 mg delayed release capsule, Take 1 capsule (30 mg total) by mouth daily for 30 days, Disp: 30 capsule, Rfl: 0    fluconazole (DIFLUCAN) 150 mg tablet, , Disp: , Rfl:     gabapentin (NEURONTIN) 100 mg capsule, Take 1 capsule (100 mg total) by mouth 3 (three) times a day for 30 days, Disp: 90 capsule, Rfl: 0    nystatin (MYCOSTATIN) ointment, , Disp: , Rfl:     sotalol (BETAPACE) 80 mg tablet, , Disp: , Rfl:     spironolactone (ALDACTONE) 25 mg tablet, , Disp: , Rfl:     TRADJENTA 5 MG TABS, , Disp: , Rfl:     TRULICITY 7 95 OE/7 9DF SOPN, , Disp: , Rfl:     Patient Active Problem List   Diagnosis    Acquired deformity of foot    Diabetic polyneuropathy associated with type 2 diabetes mellitus (HCC)    Peripheral arteriosclerosis (HCC)    Hammer toes of both feet    Swelling of lower limb    Metatarsalgia of both feet    Cellulitis and abscess of toe of right foot    Abscess of fourth toenail, right    Tinea pedis of right foot    Paronychia of toenail of right foot       HPI    The following portions of the patient's history were reviewed and updated as appropriate: allergies, current medications, past family history, past medical history, past social history, past surgical history and problem list     Review of Systems      Historical Information   No past medical history on file  No past surgical history on file  Social History   History   Alcohol use Not on file     History   Drug use: Unknown     Family History: No family history on file  Meds/Allergies   Allergies   Allergen Reactions    Latex     Other      Adhesive tapo    Penicillins        Current Outpatient Prescriptions on File Prior to Visit   Medication Sig Dispense Refill    atorvastatin (LIPITOR) 80 mg tablet       BD PEN NEEDLE ALAINA U/F 32G X 4 MM MISC       ciclopirox (LOPROX) 0 77 % cream Apply topically 2 (two) times a day for 20 days 45 g 0    DULoxetine (CYMBALTA) 30 mg delayed release capsule Take 1 capsule (30 mg total) by mouth daily for 30 days 30 capsule 0    fluconazole (DIFLUCAN) 150 mg tablet       gabapentin (NEURONTIN) 100 mg capsule Take 1 capsule (100 mg total) by mouth 3 (three) times a day for 30 days 90 capsule 0    nystatin (MYCOSTATIN) ointment       sotalol (BETAPACE) 80 mg tablet       spironolactone (ALDACTONE) 25 mg tablet       TRADJENTA 5 MG TABS       TRULICITY 8 69 LG/1 3FL SOPN        No current facility-administered medications on file prior to visit  Objective:  Patient's shoes and socks removed  Foot ExamPhysical Exam           Foot Exam     General  General Appearance: appears stated age and healthy   Orientation: alert and oriented to person, place, and time   Affect: appropriate   Gait: antalgic         Right Foot/Ankle      Inspection and Palpation  Tenderness: metatarsals   Swelling: metatarsals   Arch: pes planus  Hammertoes: third toe, fourth toe, fifth toe and second toe        Left Foot/Ankle       Inspection and Palpation  Arch: pes planus  Hammertoes: second toe and fifth toe        Physical Exam   Constitutional: She appears well-developed and well-nourished     Cardiovascular: Normal rate and regular rhythm  Skin:   Thick pinch callus noted bilateral hallux    Severe mycotic toenail bilateral

## 2018-12-11 ENCOUNTER — OFFICE VISIT (OUTPATIENT)
Dept: PODIATRY | Facility: CLINIC | Age: 83
End: 2018-12-11
Payer: MEDICARE

## 2018-12-11 VITALS
BODY MASS INDEX: 33.32 KG/M2 | HEIGHT: 65 IN | WEIGHT: 200 LBS | RESPIRATION RATE: 17 BRPM | HEART RATE: 76 BPM | SYSTOLIC BLOOD PRESSURE: 130 MMHG | DIASTOLIC BLOOD PRESSURE: 51 MMHG

## 2018-12-11 DIAGNOSIS — M79.672 PAIN IN BOTH FEET: Primary | ICD-10-CM

## 2018-12-11 DIAGNOSIS — L84 CORNS: ICD-10-CM

## 2018-12-11 DIAGNOSIS — M79.671 PAIN IN BOTH FEET: Primary | ICD-10-CM

## 2018-12-11 DIAGNOSIS — I70.209 PERIPHERAL ARTERIOSCLEROSIS (HCC): ICD-10-CM

## 2018-12-11 DIAGNOSIS — E11.42 DIABETIC POLYNEUROPATHY ASSOCIATED WITH TYPE 2 DIABETES MELLITUS (HCC): ICD-10-CM

## 2018-12-11 PROCEDURE — 11056 PARNG/CUTG B9 HYPRKR LES 2-4: CPT | Performed by: PODIATRIST

## 2019-02-18 ENCOUNTER — OFFICE VISIT (OUTPATIENT)
Dept: PODIATRY | Facility: CLINIC | Age: 84
End: 2019-02-18
Payer: MEDICARE

## 2019-02-18 VITALS
RESPIRATION RATE: 17 BRPM | DIASTOLIC BLOOD PRESSURE: 86 MMHG | WEIGHT: 200 LBS | SYSTOLIC BLOOD PRESSURE: 137 MMHG | BODY MASS INDEX: 33.32 KG/M2 | HEART RATE: 67 BPM | HEIGHT: 65 IN

## 2019-02-18 DIAGNOSIS — E11.42 DIABETIC POLYNEUROPATHY ASSOCIATED WITH TYPE 2 DIABETES MELLITUS (HCC): ICD-10-CM

## 2019-02-18 DIAGNOSIS — L84 CORNS: ICD-10-CM

## 2019-02-18 DIAGNOSIS — M79.672 PAIN IN BOTH FEET: Primary | ICD-10-CM

## 2019-02-18 DIAGNOSIS — I70.209 PERIPHERAL ARTERIOSCLEROSIS (HCC): ICD-10-CM

## 2019-02-18 DIAGNOSIS — M79.671 PAIN IN BOTH FEET: Primary | ICD-10-CM

## 2019-02-18 PROCEDURE — 11056 PARNG/CUTG B9 HYPRKR LES 2-4: CPT | Performed by: PODIATRIST

## 2019-02-18 NOTE — PROGRESS NOTES
Procedures   Foot Exam    Right Foot/Ankle     Inspection and Palpation  Skin Exam: callus and dry skin;     Neurovascular  Dorsalis pedis: 1+  Posterior tibial: 1+      Left Foot/Ankle      Inspection and Palpation  Skin Exam: callus and dry skin;     Neurovascular  Dorsalis pedis: 1+  Posterior tibial: 1+               Procedures   Foot Exam     Right Foot/Ankle      Neurovascular  Dorsalis pedis: 1+  Posterior tibial: 1+        Left Foot/Ankle       Neurovascular  Dorsalis pedis: 1+  Posterior tibial: 1+                        Signed  Encounter Date: 10/9/2018    Assessment/Plan:   Pain   Paronychia   Peripheral artery disease   Callus formation      Mycotic toenail   Plan   Diabetic foot exam performed   All nails debrided   Calluses debrided without pain or complication    There are no diagnoses linked to this encounter        Subjective:   Patient ID: Phyllis Lentz is a 80 y  o  female      No past medical history on file      No past surgical history on file                Allergies   Allergen Reactions    Latex      Other         Adhesive tapo    Penicillins              Current Outpatient Prescriptions:     atorvastatin (LIPITOR) 80 mg tablet, , Disp: , Rfl:     BD PEN NEEDLE ALAINA U/F 32G X 4 MM MISC, , Disp: , Rfl:     ciclopirox (LOPROX) 0 77 % cream, Apply topically 2 (two) times a day for 20 days, Disp: 45 g, Rfl: 0    DULoxetine (CYMBALTA) 30 mg delayed release capsule, Take 1 capsule (30 mg total) by mouth daily for 30 days, Disp: 30 capsule, Rfl: 0    fluconazole (DIFLUCAN) 150 mg tablet, , Disp: , Rfl:     gabapentin (NEURONTIN) 100 mg capsule, Take 1 capsule (100 mg total) by mouth 3 (three) times a day for 30 days, Disp: 90 capsule, Rfl: 0    nystatin (MYCOSTATIN) ointment, , Disp: , Rfl:     sotalol (BETAPACE) 80 mg tablet, , Disp: , Rfl:     spironolactone (ALDACTONE) 25 mg tablet, , Disp: , Rfl:     TRADJENTA 5 MG TABS, , Disp: , Rfl:     TRULICITY 0 64 SQ/2 6TO SOPN, , Disp: , Rfl:          Patient Active Problem List   Diagnosis    Acquired deformity of foot    Diabetic polyneuropathy associated with type 2 diabetes mellitus ([facility] )    Peripheral arteriosclerosis ([facility] )    Hammer toes of both feet    Swelling of lower limb    Metatarsalgia of both feet    Cellulitis and abscess of toe of right foot    Abscess of fourth toenail, right    Tinea pedis of right foot    Paronychia of toenail of right foot         HPI     The following portions of the patient's history were reviewed and updated as appropriate: allergies, current medications, past family history, past medical history, past social history, past surgical history and problem list      Review of Systems       Historical Information   No past medical history on file  No past surgical history on file  Social History         History   Alcohol use Not on file            History   Drug use: Unknown      Family History: No family history on file      Meds/Allergies             Allergies   Allergen Reactions    Latex      Other         Adhesive tapo    Penicillins                       Current Outpatient Prescriptions on File Prior to Visit   Medication Sig Dispense Refill    atorvastatin (LIPITOR) 80 mg tablet          BD PEN NEEDLE ALAINA U/F 32G X 4 MM MISC          ciclopirox (LOPROX) 0 77 % cream Apply topically 2 (two) times a day for 20 days 45 g 0    DULoxetine (CYMBALTA) 30 mg delayed release capsule Take 1 capsule (30 mg total) by mouth daily for 30 days 30 capsule 0    fluconazole (DIFLUCAN) 150 mg tablet          gabapentin (NEURONTIN) 100 mg capsule Take 1 capsule (100 mg total) by mouth 3 (three) times a day for 30 days 90 capsule 0    nystatin (MYCOSTATIN) ointment          sotalol (BETAPACE) 80 mg tablet          spironolactone (ALDACTONE) 25 mg tablet          TRADJENTA 5 MG TABS          TRULICITY 0 66 JF/4 5AK SOPN            No current facility-administered medications on file prior to visit           Objective:  Patient's shoes and socks removed    Foot ExamPhysical Exam              Foot Exam     General  General Appearance: appears stated age and healthy   Orientation: alert and oriented to person, place, and time   Affect: appropriate   Gait: antalgic         Right Foot/Ankle      Inspection and Palpation  Tenderness: metatarsals   Swelling: metatarsals   Arch: pes planus  Hammertoes: third toe, fourth toe, fifth toe and second toe        Left Foot/Ankle       Inspection and Palpation  Arch: pes planus  Hammertoes: second toe and fifth toe        Physical Exam   Constitutional: She appears well-developed and well-nourished  Cardiovascular: Normal rate and regular rhythm     Skin:   Thick pinch callus noted bilateral hallux   Severe mycotic toenail bilateral      Patient's shoes and socks removed  Right Foot/Ankle   Right Foot Inspection        Sensory   Vibration: diminished  Proprioception: diminished      Vascular  Capillary refills: elevated  The right DP pulse is 1+  The right PT pulse is 1+       Left Foot/Ankle  Left Foot Inspection   Patient's shoes and socks removed  Right Foot/Ankle   Right Foot Inspection  Skin Exam: dry skin, callus and callus                          Toe Exam: swelling and erythema  Sensory   Vibration: diminished  Proprioception: diminished     Vascular  Capillary refills: elevated  The right DP pulse is 1+  The right PT pulse is 1+  Left Foot/Ankle  Left Foot Inspection  Skin Exam: dry skin and callus                         Toe Exam: swelling and erythema                   Sensory   Vibration: diminished  Proprioception: diminished    Vascular  Capillary refills: elevated  The left DP pulse is 1+  The left PT pulse is 1+  Assign Risk Category:  Deformity present;  Loss of protective sensation; Weak pulses       Risk: 2

## 2019-06-22 ENCOUNTER — OFFICE VISIT (OUTPATIENT)
Dept: URGENT CARE | Facility: CLINIC | Age: 84
End: 2019-06-22
Payer: MEDICARE

## 2019-06-22 VITALS
BODY MASS INDEX: 33.32 KG/M2 | OXYGEN SATURATION: 98 % | HEIGHT: 65 IN | RESPIRATION RATE: 18 BRPM | TEMPERATURE: 97.5 F | DIASTOLIC BLOOD PRESSURE: 63 MMHG | HEART RATE: 58 BPM | SYSTOLIC BLOOD PRESSURE: 137 MMHG | WEIGHT: 200 LBS
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
## 2019-06-22 DIAGNOSIS — S40.861A TICK BITE OF RIGHT UPPER ARM, INITIAL ENCOUNTER: Primary | ICD-10-CM

## 2019-06-22 PROCEDURE — 99203 OFFICE O/P NEW LOW 30 MIN: CPT | Performed by: PHYSICIAN ASSISTANT

## 2019-06-22 RX ORDER — DOXYCYCLINE 100 MG/1
200 CAPSULE ORAL ONCE
Qty: 2 CAPSULE | Refills: 0 | Status: SHIPPED | OUTPATIENT
Start: 2019-06-22 | End: 2019-06-22

## 2019-06-22 NOTE — PATIENT INSTRUCTIONS
Tick Bite   WHAT YOU NEED TO KNOW:   Most tick bites are not dangerous, but ticks can pass disease or infection when they bite  Ticks need to be removed quickly  You may have redness, pain, itching, and swelling near the bite  Blisters may also develop  DISCHARGE INSTRUCTIONS:   Return to the emergency department if:   · You have trouble walking or moving your legs  · You have joint pain, muscle pain, or muscle weakness within 1 month of a tick bite  · You have a fever, chills, headache, or rash  Contact your healthcare provider if:   · You cannot remove the tick  · The tick's head is stuck in your skin  · You have questions or concerns about your condition or care  Medicines:   · Medicines  help decrease pain, redness, itching, and swelling  You may also need medicine to prevent or fight a bacterial infection  These medicines may be given as a cream, lotion, or pill  · Take your medicine as directed  Contact your healthcare provider if you think your medicine is not helping or if you have side effects  Tell him of her if you are allergic to any medicine  Keep a list of the medicines, vitamins, and herbs you take  Include the amounts, and when and why you take them  Bring the list or the pill bottles to follow-up visits  Carry your medicine list with you in case of an emergency  How to remove a tick:  Remove the tick as soon as possible to help prevent disease or infection  You are less likely to get sick from a tick bite if you remove the tick within 24 hours  Do not use petroleum jelly, nail polish, rubbing alcohol, or heat  These do not work and may be dangerous  Do the following to remove a tick:  · First, try a soapy cotton ball  Soak a cotton ball in liquid soap  Cover the tick with the cotton ball for 30 seconds  The tick may come off with the cotton ball when you pull it away  · Use tweezers if the soapy cotton ball does not work  Grasp the tick as close to your skin as possible  Pull the tick straight up and out  Do not touch the tick with your bare hands  · Do not twist or jerk the tick suddenly, because this may break off the tick's head or mouth parts  Do not leave any part of the tick in your skin  · Do not crush or squeeze the tick since its body may be infected with germs  Flush the tick down the toilet  · After the tick is removed, clean the area of the bite with rubbing alcohol  Then wash your hands with soap and water  Apply ice  on your bite for 15 to 20 minutes every hour or as directed  Use an ice pack, or put crushed ice in a plastic bag  Cover it with a towel before you apply it to your skin  Ice helps prevent tissue damage and decreases swelling and pain  Prevent a tick bite:  Ticks live in areas covered by brush and grass  They may even be found in your lawn if you live in certain areas  Outdoor pets can carry ticks inside the house  Ticks can grab onto you or your clothes when you walk by grass or brush  If you go into areas that contain many trees, tall grasses, and underbrush, do the following:  · Wear light colored pants and a long-sleeved shirt  Tuck your pants into your socks or boots  Tuck in your shirt  Wear sleeves that fit close to the skin at your wrists and neck  This will help prevent ticks from crawling through gaps in your clothing and onto your skin  Wear a hat in areas with trees  · Apply insect repellant on your skin  The insect repellant should contain DEET  Do not put insect repellant on skin that is cut, scratched, or irritated  Always use soap and water to wash the insect repellant off as soon as possible once you are indoors  Do not apply insect repellant on your child's face or hands  · Spray insect repellant onto your clothes  Use permethrin spray  This spray kills ticks that crawl on your clothing  Be sure to spray the tops of your boots, bottom of pant legs, and sleeve cuffs   As soon as possible, wash and dry clothing in hot water and high heat  · Check your clothing, hair, and skin for ticks  Shower within 2 hours of coming indoors  Carefully check the hairline, armpits, neck, and waist  Check your pets and children for ticks  Remove ticks from pets the same way as you remove them from people  · Decrease the risk for ticks in your yard  Ticks like to live in shady, moist areas  Kristian Dibbles your lawn regularly to keep the grass short  Trim the grass around birdbaths and fences  Cut branches that are overgrown and take them out of the yard  Clear out leaf piles  Elan Velha firewood in a dry, sofi area  Follow up with your healthcare provider as directed:  Write down your questions so you remember to ask them during your visits  © 2017 [facility] Information is for End User's use only and may not be sold, redistributed or otherwise used for commercial purposes  All illustrations and images included in CareNotes® are the copyrighted property of [employer]  or [employer]  The above information is an  only  It is not intended as medical advice for individual conditions or treatments  Talk to your doctor, nurse or pharmacist before following any medical regimen to see if it is safe and effective for you

## 2019-06-22 NOTE — PROGRESS NOTES
[email or web address]        NAME: Jennifer Gauthier is a 80 y o  female  : 1934    MRN: [id number]  DATE: 2019  TIME: 2:14 PM    Assessment and Plan   Tick bite of right upper arm, initial encounter [S40 861A, W57  XXXA]  1  Tick bite of right upper arm, initial encounter  doxycycline monohydrate (MONODOX) 100 mg capsule         Patient Instructions     Discussed condition with pt  Will treat her with 200 mg one time dose of Doxycycline for recent deer tick bite of the right upper arm  We reviewed signs and symptoms of acute Lyme and I rec she be further evaluated by PCP should there be any onset  Follow up with PCP in 3-5 days  Proceed to  ER if symptoms worsen  Chief Complaint     Chief Complaint   Patient presents with    Tick Removal     Pt reports of multiple tick bites         History of Present Illness       Pt presents with what she reports have been multiple recent tick bites, the last being in the past few days on her right upper arm  She had it successfully removed but brought it with her and said it is a deer tick  She denies previous history of Lyme, denies any drainage, bleeding from the bite site and denies any other symptoms  Review of Systems   Review of Systems   Constitutional: Negative  Respiratory: Negative  Cardiovascular: Negative  Gastrointestinal: Negative  Genitourinary: Negative  Musculoskeletal: Negative  Skin: Positive for wound (Tick bite right upper arm)  Neurological: Negative  Hematological: Does not bruise/bleed easily           Current Medications       Current Outpatient Medications:     atorvastatin (LIPITOR) 80 mg tablet, , Disp: , Rfl:     BD PEN NEEDLE ALAINA U/F 32G X 4 MM MISC, , Disp: , Rfl:     ciclopirox (LOPROX) 0 77 % cream, Apply topically 2 (two) times a day for 20 days, Disp: 45 g, Rfl: 0    DULoxetine (CYMBALTA) 30 mg delayed release capsule, Take 1 capsule (30 mg total) by mouth daily for 30 days, Disp: 30 capsule, Rfl: 0    fluconazole (DIFLUCAN) 150 mg tablet, , Disp: , Rfl:     gabapentin (NEURONTIN) 100 mg capsule, Take 1 capsule (100 mg total) by mouth 3 (three) times a day for 30 days, Disp: 90 capsule, Rfl: 0    nystatin (MYCOSTATIN) ointment, , Disp: , Rfl:     sotalol (BETAPACE) 80 mg tablet, , Disp: , Rfl:     spironolactone (ALDACTONE) 25 mg tablet, , Disp: , Rfl:     TRADJENTA 5 MG TABS, , Disp: , Rfl:     TRULICITY 7 22 GC/2 4FR SOPN, , Disp: , Rfl:     Current Allergies     Allergies as of 06/22/2019 - Reviewed 06/22/2019   Allergen Reaction Noted    Latex  02/12/2018    Other  02/12/2018    Penicillins  02/12/2018            The following portions of the patient's history were reviewed and updated as appropriate: allergies, current medications, past family history, past medical history, past social history, past surgical history and problem list      Past Medical History:   Diagnosis Date    Diabetes mellitus ([facility] [facility] )     Hypertension        Past Surgical History:   Procedure Laterality Date    CAROTID STENT      CHOLECYSTECTOMY      HYSTERECTOMY      MASTECTOMY         History reviewed  No pertinent family history  Medications have been verified  Objective   /63   Pulse 58   Temp 97 5 °F (36 4 °C)   Resp 18   Ht 5' 5" (1 651 m)   Wt 90 7 kg (200 lb)   SpO2 98%   BMI 33 28 kg/m²        Physical Exam     Physical Exam   Constitutional: She is oriented to person, place, and time  She appears well-developed and well-nourished  No distress  Neurological: She is alert and oriented to person, place, and time  Skin:   Right upper arm with visible healing tick bite site with mild surrounding inflammatory reaction but no sign of ECM and no active drainage, bleeding, or FB/tick remnant at bite site  Psychiatric: She has a normal mood and affect  Vitals reviewed

## 2019-06-26 ENCOUNTER — OFFICE VISIT (OUTPATIENT)
Dept: PODIATRY | Facility: CLINIC | Age: 84
End: 2019-06-26
Payer: MEDICARE

## 2019-06-26 VITALS
HEIGHT: 65 IN | HEART RATE: 64 BPM | DIASTOLIC BLOOD PRESSURE: 67 MMHG | SYSTOLIC BLOOD PRESSURE: 134 MMHG | RESPIRATION RATE: 17 BRPM | WEIGHT: 200 LBS | BODY MASS INDEX: 33.32 KG/M2

## 2019-06-26 DIAGNOSIS — M79.671 PAIN IN BOTH FEET: ICD-10-CM

## 2019-06-26 DIAGNOSIS — L84 CORNS: ICD-10-CM

## 2019-06-26 DIAGNOSIS — M79.672 PAIN IN BOTH FEET: ICD-10-CM

## 2019-06-26 DIAGNOSIS — E11.42 DIABETIC POLYNEUROPATHY ASSOCIATED WITH TYPE 2 DIABETES MELLITUS (HCC): Primary | ICD-10-CM

## 2019-06-26 DIAGNOSIS — I70.209 PERIPHERAL ARTERIOSCLEROSIS (HCC): ICD-10-CM

## 2019-06-26 PROCEDURE — 11056 PARNG/CUTG B9 HYPRKR LES 2-4: CPT | Performed by: PODIATRIST

## 2019-11-05 ENCOUNTER — OFFICE VISIT (OUTPATIENT)
Dept: PODIATRY | Facility: CLINIC | Age: 84
End: 2019-11-05
Payer: MEDICARE

## 2019-11-05 VITALS
WEIGHT: 200 LBS | HEIGHT: 65 IN | HEART RATE: 78 BPM | DIASTOLIC BLOOD PRESSURE: 85 MMHG | BODY MASS INDEX: 33.32 KG/M2 | SYSTOLIC BLOOD PRESSURE: 132 MMHG | RESPIRATION RATE: 17 BRPM

## 2019-11-05 DIAGNOSIS — M77.42 METATARSALGIA OF BOTH FEET: ICD-10-CM

## 2019-11-05 DIAGNOSIS — M79.672 PAIN IN BOTH FEET: ICD-10-CM

## 2019-11-05 DIAGNOSIS — E11.42 DIABETIC POLYNEUROPATHY ASSOCIATED WITH TYPE 2 DIABETES MELLITUS (HCC): Primary | ICD-10-CM

## 2019-11-05 DIAGNOSIS — I70.209 PERIPHERAL ARTERIOSCLEROSIS (HCC): ICD-10-CM

## 2019-11-05 DIAGNOSIS — M79.671 PAIN IN BOTH FEET: ICD-10-CM

## 2019-11-05 DIAGNOSIS — M77.41 METATARSALGIA OF BOTH FEET: ICD-10-CM

## 2019-11-05 DIAGNOSIS — L84 CORNS: ICD-10-CM

## 2019-11-05 PROCEDURE — 99213 OFFICE O/P EST LOW 20 MIN: CPT | Performed by: PODIATRIST

## 2019-11-05 NOTE — PROGRESS NOTES
Assessment/Plan:   Pain   Paronychia   Peripheral artery disease   Callus formation      Mycotic toenail   Plan   Diabetic foot exam performed   All nails debrided   Calluses debrided without pain or complication    There are no diagnoses linked to this encounter        Subjective:   Patient ID: Phyllis Lentz is a 80 y  o  female      Medical History   No past medical history on file          Surgical History   No past surgical history on file                    Allergies   Allergen Reactions    Latex      Other         Adhesive tapo    Penicillins              Current Outpatient Prescriptions:     atorvastatin (LIPITOR) 80 mg tablet, , Disp: , Rfl:     BD PEN NEEDLE ALAINA U/F 32G X 4 MM MISC, , Disp: , Rfl:     ciclopirox (LOPROX) 0 77 % cream, Apply topically 2 (two) times a day for 20 days, Disp: 45 g, Rfl: 0    DULoxetine (CYMBALTA) 30 mg delayed release capsule, Take 1 capsule (30 mg total) by mouth daily for 30 days, Disp: 30 capsule, Rfl: 0    fluconazole (DIFLUCAN) 150 mg tablet, , Disp: , Rfl:     gabapentin (NEURONTIN) 100 mg capsule, Take 1 capsule (100 mg total) by mouth 3 (three) times a day for 30 days, Disp: 90 capsule, Rfl: 0    nystatin (MYCOSTATIN) ointment, , Disp: , Rfl:     sotalol (BETAPACE) 80 mg tablet, , Disp: , Rfl:     spironolactone (ALDACTONE) 25 mg tablet, , Disp: , Rfl:     TRADJENTA 5 MG TABS, , Disp: , Rfl:     TRULICITY 1 75 UV/6 8AQ SOPN, , Disp: , Rfl:            Patient Active Problem List   Diagnosis    Acquired deformity of foot    Diabetic polyneuropathy associated with type 2 diabetes mellitus ([facility])    Peripheral arteriosclerosis ([facility])    Hammer toes of both feet    Swelling of lower limb    Metatarsalgia of both feet    Cellulitis and abscess of toe of right foot    Abscess of fourth toenail, right    Tinea pedis of right foot    Paronychia of toenail of right foot         HPI     The following portions of the patient's history were reviewed and updated as appropriate: allergies, current medications, past family history, past medical history, past social history, past surgical history and problem list      Review of Systems       Historical Information          Medical History   No past medical history on file       Surgical History   No past surgical history on file       Social History                History   Alcohol use Not on file            History   Drug use: Unknown      Family History: No family history on file      Meds/Allergies             Allergies   Allergen Reactions    Latex      Other         Adhesive tapo    Penicillins                       Current Outpatient Prescriptions on File Prior to Visit   Medication Sig Dispense Refill    atorvastatin (LIPITOR) 80 mg tablet          BD PEN NEEDLE ALAINA U/F 32G X 4 MM MISC          ciclopirox (LOPROX) 0 77 % cream Apply topically 2 (two) times a day for 20 days 45 g 0    DULoxetine (CYMBALTA) 30 mg delayed release capsule Take 1 capsule (30 mg total) by mouth daily for 30 days 30 capsule 0    fluconazole (DIFLUCAN) 150 mg tablet          gabapentin (NEURONTIN) 100 mg capsule Take 1 capsule (100 mg total) by mouth 3 (three) times a day for 30 days 90 capsule 0    nystatin (MYCOSTATIN) ointment          sotalol (BETAPACE) 80 mg tablet          spironolactone (ALDACTONE) 25 mg tablet          TRADJENTA 5 MG TABS          TRULICITY 3 52 FC/4 3JM SOPN            No current facility-administered medications on file prior to visit              Objective:  Patient's shoes and socks removed    Foot ExamPhysical Exam              Foot Exam     General  General Appearance: appears stated age and healthy   Orientation: alert and oriented to person, place, and time   Affect: appropriate   Gait: antalgic         Right Foot/Ankle      Inspection and Palpation  Tenderness: metatarsals   Swelling: metatarsals   Arch: pes planus  Hammertoes: third toe, fourth toe, fifth toe and second toe        Left Foot/Ankle       Inspection and Palpation  Arch: pes planus  Hammertoes: second toe and fifth toe        Physical Exam   Constitutional: She appears well-developed and well-nourished  Cardiovascular: Normal rate and regular rhythm     Skin:   Thick pinch callus noted bilateral hallux   Severe mycotic toenail bilateral         Patient's shoes and socks removed  Right Foot/Ankle   Right Foot Inspection        Sensory   Vibration: diminished  Proprioception: diminished      Vascular  Capillary refills: elevated        Left Foot/Ankle  Left Foot Inspection                                             Sensory   Vibration: diminished  Proprioception: diminished     Vascular  Capillary refills: elevated     Assign Risk Category:  Deformity present;  Loss of protective sensation; Weak pulses       Risk: 2